# Patient Record
Sex: MALE | Race: WHITE | Employment: OTHER | URBAN - METROPOLITAN AREA
[De-identification: names, ages, dates, MRNs, and addresses within clinical notes are randomized per-mention and may not be internally consistent; named-entity substitution may affect disease eponyms.]

---

## 2017-09-27 ENCOUNTER — APPOINTMENT (OUTPATIENT)
Dept: CT IMAGING | Facility: HOSPITAL | Age: 57
End: 2017-09-27
Attending: EMERGENCY MEDICINE
Payer: MEDICARE

## 2017-09-27 ENCOUNTER — HOSPITAL ENCOUNTER (EMERGENCY)
Facility: HOSPITAL | Age: 57
Discharge: HOME | End: 2017-09-27
Attending: EMERGENCY MEDICINE
Payer: MEDICARE

## 2017-09-27 VITALS
WEIGHT: 184.94 LBS | RESPIRATION RATE: 14 BRPM | HEIGHT: 67 IN | BODY MASS INDEX: 29.03 KG/M2 | HEART RATE: 64 BPM | OXYGEN SATURATION: 95 % | DIASTOLIC BLOOD PRESSURE: 80 MMHG | TEMPERATURE: 98 F | SYSTOLIC BLOOD PRESSURE: 107 MMHG
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
## 2017-09-27 DIAGNOSIS — S09.90XA CLOSED HEAD INJURY, INITIAL ENCOUNTER: ICD-10-CM

## 2017-09-27 DIAGNOSIS — S00.81XA ABRASION OF FACE, INITIAL ENCOUNTER: ICD-10-CM

## 2017-09-27 PROCEDURE — 70450 CT HEAD/BRAIN W/O DYE: CPT | Performed by: EMERGENCY MEDICINE

## 2017-09-27 PROCEDURE — 99284 EMERGENCY DEPT VISIT MOD MDM: CPT

## 2017-09-27 NOTE — ED NOTES
Minor Crisostomo at [facility] notified that pt will be returning soon.

## 2017-09-27 NOTE — ED PROVIDER NOTES
Patient Seen in: [facility] Emergency Department    History   Patient presents with:  Head Neck Injury (neurologic, musculoskeletal)  Fall (musculoskeletal, neurologic)  Laceration Abrasion (integumentary)    Stated Complaint: FALL    HPI    Patient status: Former Smoker                                                              Packs/day: 0.00      Years: 0.00      Smokeless tobacco: Never Used                      Alcohol use:  No               Comment: former alcoholic       Review of Systems    P mental status, new concerns.       Disposition and Plan     Clinical Impression:  Fall, initial encounter  (primary encounter diagnosis)  Closed head injury, initial encounter  Abrasion of face, initial encounter    Disposition:  Discharge    Follow-up:  Bib Bradford

## 2017-09-27 NOTE — ED NOTES
Called [facility] because the eta passed and they were not in the bay, patient is growing inpatient.  New ETA is in 10 mins 1327

## 2019-02-17 ENCOUNTER — APPOINTMENT (OUTPATIENT)
Dept: CT IMAGING | Facility: HOSPITAL | Age: 59
End: 2019-02-17
Attending: EMERGENCY MEDICINE
Payer: MEDICARE

## 2019-02-17 ENCOUNTER — HOSPITAL ENCOUNTER (OUTPATIENT)
Facility: HOSPITAL | Age: 59
Setting detail: OBSERVATION
Discharge: NURSING, REHAB OR LONG-TERM CARE | End: 2019-02-19
Attending: EMERGENCY MEDICINE | Admitting: INTERNAL MEDICINE
Payer: MEDICARE

## 2019-02-17 ENCOUNTER — APPOINTMENT (OUTPATIENT)
Dept: GENERAL RADIOLOGY | Facility: HOSPITAL | Age: 59
End: 2019-02-17
Attending: EMERGENCY MEDICINE
Payer: MEDICARE

## 2019-02-17 DIAGNOSIS — J06.9 UPPER RESPIRATORY TRACT INFECTION, UNSPECIFIED TYPE: ICD-10-CM

## 2019-02-17 DIAGNOSIS — R09.02 HYPOXIA: Primary | ICD-10-CM

## 2019-02-17 PROBLEM — D69.6 THROMBOCYTOPENIA (HCC): Status: ACTIVE | Noted: 2019-02-17

## 2019-02-17 PROBLEM — R73.9 HYPERGLYCEMIA: Status: ACTIVE | Noted: 2019-02-17

## 2019-02-17 LAB
ALBUMIN SERPL-MCNC: 3.7 G/DL (ref 3.4–5)
ALBUMIN/GLOB SERPL: 1.1 {RATIO} (ref 1–2)
ALLENS TEST: POSITIVE
ALP LIVER SERPL-CCNC: 64 U/L (ref 45–117)
ALT SERPL-CCNC: 6 U/L (ref 16–61)
ANION GAP SERPL CALC-SCNC: 7 MMOL/L (ref 0–18)
APTT PPP: 30.6 SECONDS (ref 26.1–34.6)
ARTERIAL BLD GAS O2 SATURATION: 96 % (ref 92–100)
ARTERIAL BLOOD GAS BASE EXCESS: 1.9
ARTERIAL BLOOD GAS HCO3: 27.3 MEQ/L (ref 22–26)
ARTERIAL BLOOD GAS PCO2: 45 MM HG (ref 35–45)
ARTERIAL BLOOD GAS PH: 7.4 (ref 7.35–7.45)
ARTERIAL BLOOD GAS PO2: 97 MM HG (ref 80–105)
AST SERPL-CCNC: 13 U/L (ref 15–37)
BASOPHILS # BLD AUTO: 0.03 X10(3) UL (ref 0–0.2)
BASOPHILS NFR BLD AUTO: 0.6 %
BILIRUB SERPL-MCNC: 0.5 MG/DL (ref 0.1–2)
BILIRUB UR QL STRIP.AUTO: NEGATIVE
BUN BLD-MCNC: 13 MG/DL (ref 7–18)
BUN/CREAT SERPL: 17.1 (ref 10–20)
CALCIUM BLD-MCNC: 8.5 MG/DL (ref 8.5–10.1)
CALCULATED O2 SATURATION: 98 % (ref 92–100)
CARBOXYHEMOGLOBIN: 1.3 % SAT (ref 0–3)
CHLORIDE SERPL-SCNC: 104 MMOL/L (ref 98–107)
CLARITY UR REFRACT.AUTO: CLEAR
CO2 SERPL-SCNC: 30 MMOL/L (ref 21–32)
COLOR UR AUTO: YELLOW
CREAT BLD-MCNC: 0.76 MG/DL (ref 0.7–1.3)
DEPRECATED RDW RBC AUTO: 46.3 FL (ref 35.1–46.3)
EOSINOPHIL # BLD AUTO: 0 X10(3) UL (ref 0–0.7)
EOSINOPHIL NFR BLD AUTO: 0 %
ERYTHROCYTE [DISTWIDTH] IN BLOOD BY AUTOMATED COUNT: 13.4 % (ref 11–15)
GLOBULIN PLAS-MCNC: 3.4 G/DL (ref 2.8–4.4)
GLUCOSE BLD-MCNC: 101 MG/DL (ref 70–99)
GLUCOSE UR STRIP.AUTO-MCNC: NEGATIVE MG/DL
HCT VFR BLD AUTO: 44.9 % (ref 39–53)
HGB BLD-MCNC: 15.1 G/DL (ref 13–17.5)
IMM GRANULOCYTES # BLD AUTO: 0.03 X10(3) UL (ref 0–1)
IMM GRANULOCYTES NFR BLD: 0.6 %
INR BLD: 1.04 (ref 0.9–1.1)
L/M: 3 L/MIN
LACTATE SERPL-SCNC: 1.1 MMOL/L (ref 0.4–2)
LEUKOCYTE ESTERASE UR QL STRIP.AUTO: NEGATIVE
LYMPHOCYTES # BLD AUTO: 0.55 X10(3) UL (ref 1–4)
LYMPHOCYTES NFR BLD AUTO: 10.7 %
M PROTEIN MFR SERPL ELPH: 7.1 G/DL (ref 6.4–8.2)
MCH RBC QN AUTO: 31.5 PG (ref 26–34)
MCHC RBC AUTO-ENTMCNC: 33.6 G/DL (ref 31–37)
MCV RBC AUTO: 93.7 FL (ref 80–100)
METHEMOGLOBIN: 0.7 % SAT (ref 0.4–1.5)
MONOCYTES # BLD AUTO: 0.86 X10(3) UL (ref 0.1–1)
MONOCYTES NFR BLD AUTO: 16.7 %
NEUTROPHILS # BLD AUTO: 3.69 X10 (3) UL (ref 1.5–7.7)
NEUTROPHILS # BLD AUTO: 3.69 X10(3) UL (ref 1.5–7.7)
NEUTROPHILS NFR BLD AUTO: 71.4 %
NITRITE UR QL STRIP.AUTO: NEGATIVE
OSMOLALITY SERPL CALC.SUM OF ELEC: 292 MOSM/KG (ref 275–295)
P/F RATIO: 467.5 MMHG
PATIENT TEMPERATURE: 98.2 F
PH UR STRIP.AUTO: 5 [PH] (ref 4.5–8)
PLATELET # BLD AUTO: 141 10(3)UL (ref 150–450)
POTASSIUM SERPL-SCNC: 3.7 MMOL/L (ref 3.5–5.1)
PROT UR STRIP.AUTO-MCNC: NEGATIVE MG/DL
PSA SERPL DL<=0.01 NG/ML-MCNC: 14 SECONDS (ref 12.4–14.7)
RBC # BLD AUTO: 4.79 X10(6)UL (ref 4.3–5.7)
RBC UR QL AUTO: NEGATIVE
SODIUM SERPL-SCNC: 141 MMOL/L (ref 136–145)
SP GR UR STRIP.AUTO: 1.03 (ref 1–1.03)
TOTAL HEMOGLOBIN: 13.9 G/DL (ref 13.2–17.3)
TROPONIN I SERPL-MCNC: <0.045 NG/ML (ref ?–0.04)
UROBILINOGEN UR STRIP.AUTO-MCNC: <2 MG/DL
VALPROATE SERPL-MCNC: 50.6 UG/ML (ref 50–100)
WBC # BLD AUTO: 5.2 X10(3) UL (ref 4–11)

## 2019-02-17 PROCEDURE — 96361 HYDRATE IV INFUSION ADD-ON: CPT

## 2019-02-17 PROCEDURE — 85018 HEMOGLOBIN: CPT | Performed by: EMERGENCY MEDICINE

## 2019-02-17 PROCEDURE — 87633 RESP VIRUS 12-25 TARGETS: CPT | Performed by: EMERGENCY MEDICINE

## 2019-02-17 PROCEDURE — 36600 WITHDRAWAL OF ARTERIAL BLOOD: CPT | Performed by: EMERGENCY MEDICINE

## 2019-02-17 PROCEDURE — 99285 EMERGENCY DEPT VISIT HI MDM: CPT

## 2019-02-17 PROCEDURE — 81003 URINALYSIS AUTO W/O SCOPE: CPT | Performed by: EMERGENCY MEDICINE

## 2019-02-17 PROCEDURE — 96360 HYDRATION IV INFUSION INIT: CPT

## 2019-02-17 PROCEDURE — 85610 PROTHROMBIN TIME: CPT | Performed by: EMERGENCY MEDICINE

## 2019-02-17 PROCEDURE — 84145 PROCALCITONIN (PCT): CPT | Performed by: EMERGENCY MEDICINE

## 2019-02-17 PROCEDURE — 84484 ASSAY OF TROPONIN QUANT: CPT | Performed by: EMERGENCY MEDICINE

## 2019-02-17 PROCEDURE — 87798 DETECT AGENT NOS DNA AMP: CPT | Performed by: EMERGENCY MEDICINE

## 2019-02-17 PROCEDURE — 71045 X-RAY EXAM CHEST 1 VIEW: CPT | Performed by: EMERGENCY MEDICINE

## 2019-02-17 PROCEDURE — 83050 HGB METHEMOGLOBIN QUAN: CPT | Performed by: EMERGENCY MEDICINE

## 2019-02-17 PROCEDURE — 87486 CHLMYD PNEUM DNA AMP PROBE: CPT | Performed by: EMERGENCY MEDICINE

## 2019-02-17 PROCEDURE — 87999 UNLISTED MICROBIOLOGY PX: CPT

## 2019-02-17 PROCEDURE — 87581 M.PNEUMON DNA AMP PROBE: CPT | Performed by: EMERGENCY MEDICINE

## 2019-02-17 PROCEDURE — 82375 ASSAY CARBOXYHB QUANT: CPT | Performed by: EMERGENCY MEDICINE

## 2019-02-17 PROCEDURE — 83605 ASSAY OF LACTIC ACID: CPT | Performed by: EMERGENCY MEDICINE

## 2019-02-17 PROCEDURE — 93010 ELECTROCARDIOGRAM REPORT: CPT

## 2019-02-17 PROCEDURE — 80164 ASSAY DIPROPYLACETIC ACD TOT: CPT | Performed by: EMERGENCY MEDICINE

## 2019-02-17 PROCEDURE — 70450 CT HEAD/BRAIN W/O DYE: CPT | Performed by: EMERGENCY MEDICINE

## 2019-02-17 PROCEDURE — 80053 COMPREHEN METABOLIC PANEL: CPT | Performed by: EMERGENCY MEDICINE

## 2019-02-17 PROCEDURE — 93005 ELECTROCARDIOGRAM TRACING: CPT

## 2019-02-17 PROCEDURE — 85025 COMPLETE CBC W/AUTO DIFF WBC: CPT | Performed by: EMERGENCY MEDICINE

## 2019-02-17 PROCEDURE — 82803 BLOOD GASES ANY COMBINATION: CPT | Performed by: EMERGENCY MEDICINE

## 2019-02-17 PROCEDURE — 85730 THROMBOPLASTIN TIME PARTIAL: CPT | Performed by: EMERGENCY MEDICINE

## 2019-02-17 RX ORDER — SODIUM CHLORIDE 9 MG/ML
INJECTION, SOLUTION INTRAVENOUS ONCE
Status: COMPLETED | OUTPATIENT
Start: 2019-02-17 | End: 2019-02-17

## 2019-02-17 RX ORDER — IBUPROFEN 600 MG/1
600 TABLET ORAL ONCE
Status: COMPLETED | OUTPATIENT
Start: 2019-02-17 | End: 2019-02-17

## 2019-02-18 ENCOUNTER — APPOINTMENT (OUTPATIENT)
Dept: GENERAL RADIOLOGY | Facility: HOSPITAL | Age: 59
End: 2019-02-18
Attending: INTERNAL MEDICINE
Payer: MEDICARE

## 2019-02-18 ENCOUNTER — APPOINTMENT (OUTPATIENT)
Dept: CT IMAGING | Facility: HOSPITAL | Age: 59
End: 2019-02-18
Attending: INTERNAL MEDICINE
Payer: MEDICARE

## 2019-02-18 PROBLEM — J06.9 UPPER RESPIRATORY TRACT INFECTION, UNSPECIFIED TYPE: Status: ACTIVE | Noted: 2019-02-18

## 2019-02-18 LAB
ADENOVIRUS PCR:: NEGATIVE
ALBUMIN SERPL-MCNC: 3.4 G/DL (ref 3.4–5)
ALBUMIN/GLOB SERPL: 1.2 {RATIO} (ref 1–2)
ALP LIVER SERPL-CCNC: 57 U/L (ref 45–117)
ALT SERPL-CCNC: 18 U/L (ref 16–61)
ANION GAP SERPL CALC-SCNC: 6 MMOL/L (ref 0–18)
AST SERPL-CCNC: 18 U/L (ref 15–37)
ATRIAL RATE: 92 BPM
B PERT DNA SPEC QL NAA+PROBE: NEGATIVE
BASOPHILS # BLD AUTO: 0.01 X10(3) UL (ref 0–0.2)
BASOPHILS NFR BLD AUTO: 0.2 %
BILIRUB SERPL-MCNC: 0.4 MG/DL (ref 0.1–2)
BUN BLD-MCNC: 12 MG/DL (ref 7–18)
BUN/CREAT SERPL: 20 (ref 10–20)
C PNEUM DNA SPEC QL NAA+PROBE: NEGATIVE
CALCIUM BLD-MCNC: 8.1 MG/DL (ref 8.5–10.1)
CHLORIDE SERPL-SCNC: 107 MMOL/L (ref 98–107)
CO2 SERPL-SCNC: 29 MMOL/L (ref 21–32)
CORONAVIRUS 229E PCR:: NEGATIVE
CORONAVIRUS HKU1 PCR:: NEGATIVE
CORONAVIRUS NL63 PCR:: NEGATIVE
CORONAVIRUS OC43 PCR:: NEGATIVE
CREAT BLD-MCNC: 0.6 MG/DL (ref 0.7–1.3)
D-DIMER: 0.56 UG/ML FEU (ref 0–0.49)
DEPRECATED RDW RBC AUTO: 47.1 FL (ref 35.1–46.3)
EOSINOPHIL # BLD AUTO: 0.01 X10(3) UL (ref 0–0.7)
EOSINOPHIL NFR BLD AUTO: 0.2 %
ERYTHROCYTE [DISTWIDTH] IN BLOOD BY AUTOMATED COUNT: 13.3 % (ref 11–15)
FLUAV H1 2009 PAND RNA SPEC QL NAA+PROBE: POSITIVE
FLUBV RNA SPEC QL NAA+PROBE: NEGATIVE
GLOBULIN PLAS-MCNC: 2.9 G/DL (ref 2.8–4.4)
GLUCOSE BLD-MCNC: 96 MG/DL (ref 70–99)
HCT VFR BLD AUTO: 42.2 % (ref 39–53)
HGB BLD-MCNC: 13.9 G/DL (ref 13–17.5)
IMM GRANULOCYTES # BLD AUTO: 0.03 X10(3) UL (ref 0–1)
IMM GRANULOCYTES NFR BLD: 0.5 %
LYMPHOCYTES # BLD AUTO: 0.48 X10(3) UL (ref 1–4)
LYMPHOCYTES NFR BLD AUTO: 7.6 %
M PROTEIN MFR SERPL ELPH: 6.3 G/DL (ref 6.4–8.2)
MCH RBC QN AUTO: 31.4 PG (ref 26–34)
MCHC RBC AUTO-ENTMCNC: 32.9 G/DL (ref 31–37)
MCV RBC AUTO: 95.5 FL (ref 80–100)
METAPNEUMOVIRUS PCR:: NEGATIVE
MONOCYTES # BLD AUTO: 0.84 X10(3) UL (ref 0.1–1)
MONOCYTES NFR BLD AUTO: 13.3 %
MYCOPLASMA PNEUMONIA PCR:: NEGATIVE
NEUTROPHILS # BLD AUTO: 4.94 X10 (3) UL (ref 1.5–7.7)
NEUTROPHILS # BLD AUTO: 4.94 X10(3) UL (ref 1.5–7.7)
NEUTROPHILS NFR BLD AUTO: 78.2 %
OSMOLALITY SERPL CALC.SUM OF ELEC: 294 MOSM/KG (ref 275–295)
P AXIS: 27 DEGREES
P-R INTERVAL: 162 MS
PARAINFLUENZA 1 PCR:: NEGATIVE
PARAINFLUENZA 2 PCR:: NEGATIVE
PARAINFLUENZA 3 PCR:: NEGATIVE
PARAINFLUENZA 4 PCR:: NEGATIVE
PLATELET # BLD AUTO: 122 10(3)UL (ref 150–450)
POTASSIUM SERPL-SCNC: 3.6 MMOL/L (ref 3.5–5.1)
PROCALCITONIN SERPL-MCNC: <0.11 NG/ML
Q-T INTERVAL: 350 MS
QRS DURATION: 88 MS
QTC CALCULATION (BEZET): 432 MS
R AXIS: 32 DEGREES
RBC # BLD AUTO: 4.42 X10(6)UL (ref 4.3–5.7)
RHINOVIRUS/ENTERO PCR:: NEGATIVE
RSV RNA SPEC QL NAA+PROBE: NEGATIVE
SODIUM SERPL-SCNC: 142 MMOL/L (ref 136–145)
T AXIS: 19 DEGREES
VENTRICULAR RATE: 92 BPM
WBC # BLD AUTO: 6.3 X10(3) UL (ref 4–11)

## 2019-02-18 PROCEDURE — 85025 COMPLETE CBC W/AUTO DIFF WBC: CPT | Performed by: INTERNAL MEDICINE

## 2019-02-18 PROCEDURE — 97165 OT EVAL LOW COMPLEX 30 MIN: CPT

## 2019-02-18 PROCEDURE — 92610 EVALUATE SWALLOWING FUNCTION: CPT

## 2019-02-18 PROCEDURE — 85378 FIBRIN DEGRADE SEMIQUANT: CPT | Performed by: INTERNAL MEDICINE

## 2019-02-18 PROCEDURE — 96372 THER/PROPH/DIAG INJ SC/IM: CPT

## 2019-02-18 PROCEDURE — 71045 X-RAY EXAM CHEST 1 VIEW: CPT | Performed by: INTERNAL MEDICINE

## 2019-02-18 PROCEDURE — 80053 COMPREHEN METABOLIC PANEL: CPT | Performed by: INTERNAL MEDICINE

## 2019-02-18 PROCEDURE — 97161 PT EVAL LOW COMPLEX 20 MIN: CPT

## 2019-02-18 PROCEDURE — 97530 THERAPEUTIC ACTIVITIES: CPT

## 2019-02-18 PROCEDURE — 71275 CT ANGIOGRAPHY CHEST: CPT | Performed by: INTERNAL MEDICINE

## 2019-02-18 RX ORDER — ONDANSETRON 2 MG/ML
4 INJECTION INTRAMUSCULAR; INTRAVENOUS EVERY 6 HOURS PRN
Status: DISCONTINUED | OUTPATIENT
Start: 2019-02-18 | End: 2019-02-19

## 2019-02-18 RX ORDER — SODIUM CHLORIDE 9 MG/ML
INJECTION, SOLUTION INTRAVENOUS CONTINUOUS
Status: ACTIVE | OUTPATIENT
Start: 2019-02-18 | End: 2019-02-18

## 2019-02-18 RX ORDER — OSELTAMIVIR PHOSPHATE 75 MG/1
75 CAPSULE ORAL 2 TIMES DAILY
Status: DISCONTINUED | OUTPATIENT
Start: 2019-02-18 | End: 2019-02-19

## 2019-02-18 RX ORDER — SODIUM CHLORIDE 450 MG/100ML
INJECTION, SOLUTION INTRAVENOUS CONTINUOUS
Status: DISCONTINUED | OUTPATIENT
Start: 2019-02-18 | End: 2019-02-19

## 2019-02-18 RX ORDER — HEPARIN SODIUM 5000 [USP'U]/ML
5000 INJECTION, SOLUTION INTRAVENOUS; SUBCUTANEOUS EVERY 12 HOURS
Status: DISCONTINUED | OUTPATIENT
Start: 2019-02-18 | End: 2019-02-19

## 2019-02-18 RX ORDER — ACETAMINOPHEN 325 MG/1
650 TABLET ORAL EVERY 4 HOURS PRN
Status: DISCONTINUED | OUTPATIENT
Start: 2019-02-18 | End: 2019-02-19

## 2019-02-18 RX ORDER — HYDRALAZINE HYDROCHLORIDE 20 MG/ML
10 INJECTION INTRAMUSCULAR; INTRAVENOUS EVERY 6 HOURS PRN
Status: DISCONTINUED | OUTPATIENT
Start: 2019-02-18 | End: 2019-02-19

## 2019-02-18 RX ORDER — OSELTAMIVIR PHOSPHATE 75 MG/1
75 CAPSULE ORAL 2 TIMES DAILY
Qty: 9 CAPSULE | Refills: 0 | Status: SHIPPED | OUTPATIENT
Start: 2019-02-18

## 2019-02-18 RX ORDER — IPRATROPIUM BROMIDE AND ALBUTEROL SULFATE 2.5; .5 MG/3ML; MG/3ML
3 SOLUTION RESPIRATORY (INHALATION) EVERY 4 HOURS PRN
Status: DISCONTINUED | OUTPATIENT
Start: 2019-02-18 | End: 2019-02-19

## 2019-02-18 RX ORDER — DIVALPROEX SODIUM 500 MG/1
500 TABLET, DELAYED RELEASE ORAL 3 TIMES DAILY
Status: DISCONTINUED | OUTPATIENT
Start: 2019-02-18 | End: 2019-02-19

## 2019-02-18 RX ORDER — POLYETHYLENE GLYCOL 3350 17 G/17G
17 POWDER, FOR SOLUTION ORAL DAILY
Status: DISCONTINUED | OUTPATIENT
Start: 2019-02-18 | End: 2019-02-19

## 2019-02-18 RX ORDER — MELATONIN
1000 NIGHTLY
Status: DISCONTINUED | OUTPATIENT
Start: 2019-02-18 | End: 2019-02-19

## 2019-02-18 RX ORDER — LEVOTHYROXINE SODIUM 0.1 MG/1
100 TABLET ORAL
Status: DISCONTINUED | OUTPATIENT
Start: 2019-02-18 | End: 2019-02-19

## 2019-02-18 RX ORDER — ASPIRIN 325 MG
325 TABLET ORAL DAILY
Status: DISCONTINUED | OUTPATIENT
Start: 2019-02-18 | End: 2019-02-19

## 2019-02-18 RX ORDER — CALCIUM CARBONATE 200(500)MG
500 TABLET,CHEWABLE ORAL DAILY
Status: DISCONTINUED | OUTPATIENT
Start: 2019-02-18 | End: 2019-02-19

## 2019-02-18 RX ORDER — OXYBUTYNIN CHLORIDE 5 MG/1
5 TABLET, EXTENDED RELEASE ORAL DAILY
Status: DISCONTINUED | OUTPATIENT
Start: 2019-02-18 | End: 2019-02-19

## 2019-02-18 RX ORDER — DIPHENHYDRAMINE HYDROCHLORIDE 50 MG/ML
25 INJECTION INTRAMUSCULAR; INTRAVENOUS EVERY 6 HOURS PRN
Status: DISCONTINUED | OUTPATIENT
Start: 2019-02-18 | End: 2019-02-19

## 2019-02-18 RX ORDER — SODIUM CHLORIDE 9 MG/ML
500 INJECTION, SOLUTION INTRAVENOUS CONTINUOUS
Status: ACTIVE | OUTPATIENT
Start: 2019-02-18 | End: 2019-02-18

## 2019-02-18 NOTE — CM/SW NOTE
RN informed  that pt may be ready for dc back to [facility]. [facility] on wll call. [facility] notified of possible dc.   Heather Seaman, 02/18/19, 2:35 PM

## 2019-02-18 NOTE — PHYSICAL THERAPY NOTE
PHYSICAL THERAPY EVALUATION - INPATIENT     Room Number: 7815/0256-H  Evaluation Date: 2/18/2019  Type of Evaluation: Initial  Physician Order: PT Eval and Treat    Presenting Problem: fever & cough, + fall  Reason for Therapy: Mobility Dysfunction a risk    WEIGHT BEARING RESTRICTION  Weight Bearing Restriction: None                PAIN ASSESSMENT  Ratin  Location: denies       COGNITION  · Overall Cognitive Status:  HANS - unable to assess  · Following Commands:  follows one-step commands inconsis Degree of Impairment: 50.57%   Standardized Score (AM-PAC Scale): 42.13   CMS Modifier (G-Code): CK    FUNCTIONAL ABILITY STATUS  Gait Assessment   Gait Assistance: Dependent assistance(FIM due to distance actual min a)  Distance (ft): 25  Assistive Device to prior to level of function. DISCHARGE RECOMMENDATIONS  PT Discharge Recommendations: Home with home health PT(return to skilled nursing facility)    PLAN  PT Treatment Plan: Bed mobility; Endurance; Energy conservation;Patient education; Family education;

## 2019-02-18 NOTE — ED NOTES
Family arrived and told RN that the nursing home told her he fell. EMS did not get that same information to relay to the nurse.

## 2019-02-18 NOTE — CM/SW NOTE
02/18/19 1000   CM/SW Referral Data   Referral Source Social Work (self-referral)   Reason for Referral Discharge [facility] Staff   Patient [address] Name Hank Lamas)   Discharge

## 2019-02-18 NOTE — OCCUPATIONAL THERAPY NOTE
OCCUPATIONAL THERAPY EVALUATION - INPATIENT     Room Number: 1626/0225-L  Evaluation Date: 2/18/2019  Type of Evaluation: Initial  Presenting Problem: hypoxia    Physician Order: IP Consult to Occupational Therapy  Reason for Therapy: ADL/IADL Dysfunction Right  Drives: No       Prior Level of Function: Unclear PLOF d/t pt not being able to provide this information. Per chart pt lives in a skilled facility. Pt stated he walks with a walker and does get help with some ADLs.      SUBJECTIVE   Pt stated, \"I' assistance    Skilled Therapy Provided: Pt received supine for eval.  Explained the role of OT while in the hospital.   Pt participated in UE assessment and orientation. Pt sat EOB.   Pt stood to utilize the urinal.  Pt completed mobility in the room and h facility)       PLAN  OT Treatment Plan: Balance activities; Energy conservation/work simplification techniques;ADL training;Functional transfer training; Endurance training;Patient/Family education;Patient/Family training;Continued evaluation  [facility]

## 2019-02-18 NOTE — SLP NOTE
ADULT SWALLOWING EVALUATION    ASSESSMENT    ASSESSMENT/OVERALL IMPRESSION:  Order received for bedside swallow evaluation to r/o aspiration; chart review revealed Pt previously on mechanical soft ground diet PTA.  Pt is a 59 y/o male who presented to ED af Problem:    Hypoxia  Active Problems:     [facility]    Hyperglycemia    Upper respiratory tract infection, unspecified type      Past Medical History  Past Medical History:   Diagnosis Date   • Alcoholic ([facility]    • Alzheimer disease    • Anxiet Limits  Range of Motion: Within Functional Limits  Rate of Motion: Within Functional Limits    Voice Quality: Clear  Respiratory Status: Unlabored  Consistencies Trialed: Thin liquids; Soft solid;Hard solid  Method of Presentation: Self presentation;Cup;Str

## 2019-02-18 NOTE — CONSULTS
[facility]  Report of Consultation    Keiry Cannond Patient Status:  Observation    6/10/1960 MRN [id number]   [facility] 3NE-A Attending Zuleyma Sams MD   Hosp Day # 0 PCP Fara Sigala MD     Reason for Consultation: Fever, [facility]     History reviewed. No pertinent family history. reports that he has quit smoking. he has never used smokeless tobacco. He reports that he does not drink alcohol or use drugs.     Medications:    Medications Prior to Admission:  bisacodyl (D needed. Disp:  Rfl:  Taking   Ergocalciferol (VITAMIN D CAPS 20053 IU OR) Take  by mouth.  Disp:  Rfl:  Taking     Review of Systems: No new/positive culture results    Vital signs:  /69 (BP Location: Right arm)   Pulse 95   Temp 98.7 °F (37.1 °C) (Ax Recent Labs   Lab  02/17/19   7457   ABGPHT  7.40   TJTXLC4C  45   XIVJS5Y  97   ABGHCO3  27.3*   ABGBE  1.9   TEMP  98.2   MARQUISE  Positive   SITE  Right Radial   DEV  Nasal cannula   THGB  13.9         Cultures: Nasal swab positive for influenza A

## 2019-02-18 NOTE — ED INITIAL ASSESSMENT (HPI)
Fever and cough. Nursing home staff stated his blood pressure and heart rate dropped today and low o2 sats.

## 2019-02-18 NOTE — ED PROVIDER NOTES
Patient Seen in: [facility] Emergency Department    History   Patient presents with:  Fever (infectious)  Cough/URI    Stated Complaint: fever cough    HPI  Patient is a 58-year-old male who is a poor historian secondary to traumatic brain injury. HPI.  Constitutional and vital signs reviewed. All other systems reviewed and negative except as noted above.     Physical Exam     ED Triage Vitals [02/17/19 1930]   /69   Pulse 91   Resp 25   Temp (!) 101.4 °F (38.6 °C)   Temp src Temporal   Sp the following components:    D-Dimer 0.56 (*)     All other components within normal limits    Narrative:     FEU = Fibrinogen Equivalent Units.     D-Dimer results of less than 0.5 ug/mL (FEU) have been shown to contribute to the exclusion of venous thromb Please view results for these tests on the individual orders. RAINBOW DRAW BLUE   RAINBOW DRAW LAVENDER   RAINBOW DRAW LIGHT GREEN   RAINBOW DRAW GOLD     EKG    Rate, intervals and axes as noted on EKG Report.   Rate: 92  Rhythm: Sinus Rh unspecified type J06.9 2/18/2019

## 2019-02-18 NOTE — PLAN OF CARE
NURSING ADMISSION NOTE      Patient admitted via Cart  Oriented to room. Safety precautions initiated. Bed in low position. Call light in reach. Admission navigator completed per nursing paperwork. A&O x 1-2. On 3L O2. NSR on tele.  Seizure precau

## 2019-02-18 NOTE — H&P
[facility]    PATIENT'S NAME: Chanelle Ridenilson   ATTENDING PHYSICIAN: Magda Kuhn M.D.    PATIENT ACCOUNT#:   [de-identified]    LOCATION:  [address] [place] [address]  MEDICAL RECORD #:   [id number]       YOB: 1960  ADMISSION DATE:       02/17/201 positive for influenza A H1N1. Urinalysis negative. CMP essentially unremarkable. CT brain, nothing acute. IMPRESSION AND PLAN:    1. Influenza A. Tamiflu started.   2.   Hypoxia, most likely related to upper respiratory infection secondary t

## 2019-02-18 NOTE — PLAN OF CARE
Impaired Swallowing    • Minimize aspiration risk Progressing        Patient is alert and orientated x2. Denies pain or discomfort. VSS, SR-ST on tele, 3L O2. Droplet precaution for Flu +, pt started on tamilflu. D-dimer elevated, Dr. Lori Camarena notified.

## 2019-02-19 VITALS
DIASTOLIC BLOOD PRESSURE: 75 MMHG | HEIGHT: 66 IN | HEART RATE: 72 BPM | RESPIRATION RATE: 20 BRPM | OXYGEN SATURATION: 96 % | BODY MASS INDEX: 29.67 KG/M2 | SYSTOLIC BLOOD PRESSURE: 123 MMHG | WEIGHT: 184.63 LBS | TEMPERATURE: 98 F

## 2019-02-19 LAB
BASOPHILS # BLD AUTO: 0.01 X10(3) UL (ref 0–0.2)
BASOPHILS NFR BLD AUTO: 0.2 %
DEPRECATED RDW RBC AUTO: 45.4 FL (ref 35.1–46.3)
EOSINOPHIL # BLD AUTO: 0.01 X10(3) UL (ref 0–0.7)
EOSINOPHIL NFR BLD AUTO: 0.2 %
ERYTHROCYTE [DISTWIDTH] IN BLOOD BY AUTOMATED COUNT: 13.2 % (ref 11–15)
HCT VFR BLD AUTO: 39.8 % (ref 39–53)
HGB BLD-MCNC: 12.8 G/DL (ref 13–17.5)
IMM GRANULOCYTES # BLD AUTO: 0.03 X10(3) UL (ref 0–1)
IMM GRANULOCYTES NFR BLD: 0.5 %
LYMPHOCYTES # BLD AUTO: 1.17 X10(3) UL (ref 1–4)
LYMPHOCYTES NFR BLD AUTO: 18.7 %
MCH RBC QN AUTO: 30.3 PG (ref 26–34)
MCHC RBC AUTO-ENTMCNC: 32.2 G/DL (ref 31–37)
MCV RBC AUTO: 94.1 FL (ref 80–100)
MONOCYTES # BLD AUTO: 0.91 X10(3) UL (ref 0.1–1)
MONOCYTES NFR BLD AUTO: 14.5 %
NEUTROPHILS # BLD AUTO: 4.14 X10 (3) UL (ref 1.5–7.7)
NEUTROPHILS # BLD AUTO: 4.14 X10(3) UL (ref 1.5–7.7)
NEUTROPHILS NFR BLD AUTO: 65.9 %
PLATELET # BLD AUTO: 119 10(3)UL (ref 150–450)
RBC # BLD AUTO: 4.23 X10(6)UL (ref 4.3–5.7)
WBC # BLD AUTO: 6.3 X10(3) UL (ref 4–11)

## 2019-02-19 PROCEDURE — 96372 THER/PROPH/DIAG INJ SC/IM: CPT

## 2019-02-19 PROCEDURE — 85025 COMPLETE CBC W/AUTO DIFF WBC: CPT | Performed by: INTERNAL MEDICINE

## 2019-02-19 NOTE — PLAN OF CARE
A/O x 4. Parkinson's, dementia. Follows instructions. Confused at times. Droplet precautions for flu+. Tamiflu started. Pills whole with water  2L PRN. . Room air daytime baseline.  Cough dry, not productive  CTA chest negative for PE  Heparin subQ  Ord

## 2019-02-19 NOTE — PROGRESS NOTES
[facility]  Progress Note    Dawn Galvin Patient Status:  Observation    6/10/1960 MRN [id number]   [facility] 3NE-A Attending Zamzam Thompson MD   Hosp Day # 0 PCP Bianca Wagner MD        Assessment and Plan:     1.  Influenza 185 lb (83.9 kg)      Physical Exam:  General: no apparent distress  Heart: Regular rate and rhythm, normal S1S2  Lungs: no wheezign   Abdomen: soft, nontender, no guarding, no rebound, positive BS  Extremity: no lower extremity edema, no cyanosis  Neurolo (BENADRYL) injection 25 mg 25 mg Intravenous Q6H PRN   Heparin Sodium (Porcine) 5000 UNIT/ML injection 5,000 Units 5,000 Units Subcutaneous Q12H   ondansetron HCl (ZOFRAN) injection 4 mg 4 mg Intravenous Q6H PRN   0.45% NaCl infusion  Intravenous Continuou

## 2019-02-19 NOTE — CM/SW NOTE
SW updated that pt may be stable for return to [place] today. MICHELLE spoke with admissions liaison, Volanda Paget who is aware of and confirms bed available for return. [facility] placed on will call for transport ([phone]). PCS completed via [facility].  Social wor

## 2019-02-19 NOTE — PLAN OF CARE
NURSING DISCHARGE NOTE    Discharged Nursing home via Ambulance. Accompanied by Support staff  Belongings Taken by patient/family. Called [facility] with updates. Hard copy RX sent in envelope along with discharge paperwork for NH.  Educated patient

## 2019-02-19 NOTE — PLAN OF CARE
Assumed care of patient at 0700. Patient is AOx2-3 this morning. Weening off of oxygen this morning, as he does not regularly wear. NSR on telemetry. SCD's in place & heparin for DVT prophylaxis. Bed alarm on for fall prevention.   [place] catheter in pl

## 2019-02-19 NOTE — DISCHARGE SUMMARY
[facility]  Discharge Summary    Compa Castro Patient Status:  Observation    6/10/1960 MRN [id number]   [facility] 3NE-A Attending Yenifer West MD   Hosp Day # 0 PCP Jesus Bonner MD     Date of Admission: 2019    Date of Atraumatic. Eyes:  EOMI. NECK:  Supple. No JVD. LUNGS:  Clear to auscultation. No rhonchi or wheeze. HEART:  Regular rate and rhythm. No S3.  ABDOMEN:  Bowel sounds present. Soft, nontender. EXTREMITIES:  No cyanosis or icterus.    NEUROLOGIC:  Ge by mouth 2 (two) times daily. Qty: 9 capsule Refills: 0      CONTINUE these medications which have NOT CHANGED    bisacodyl (DULCOLAX) 5 MG Oral Tab EC  Take 5 mg by mouth 2 (two) times daily. Levothyroxine Sodium 100 MCG Oral Cap  Take  by mouth.     d

## 2019-02-20 NOTE — CM/SW NOTE
02/20/19 0911   Discharge disposition   Expected discharge disposition Skilled Nurs   Name of Facillity/Home Care/Hospice ([address])   Discharge transportation [employer]

## 2019-03-04 ENCOUNTER — APPOINTMENT (OUTPATIENT)
Dept: CT IMAGING | Facility: HOSPITAL | Age: 59
End: 2019-03-04
Attending: EMERGENCY MEDICINE
Payer: MEDICARE

## 2019-03-04 ENCOUNTER — HOSPITAL ENCOUNTER (EMERGENCY)
Facility: HOSPITAL | Age: 59
Discharge: HOME | End: 2019-03-04
Attending: EMERGENCY MEDICINE
Payer: MEDICARE

## 2019-03-04 VITALS
RESPIRATION RATE: 16 BRPM | BODY MASS INDEX: 29.65 KG/M2 | DIASTOLIC BLOOD PRESSURE: 68 MMHG | TEMPERATURE: 98 F | SYSTOLIC BLOOD PRESSURE: 102 MMHG | OXYGEN SATURATION: 95 % | WEIGHT: 184.5 LBS | HEIGHT: 66 IN | HEART RATE: 65 BPM

## 2019-03-04 DIAGNOSIS — S41.112A LACERATION OF LEFT UPPER EXTREMITY, INITIAL ENCOUNTER: ICD-10-CM

## 2019-03-04 DIAGNOSIS — S01.312A LACERATION OF AURICLE OF LEFT EAR, INITIAL ENCOUNTER: Primary | ICD-10-CM

## 2019-03-04 DIAGNOSIS — S09.90XA INJURY OF HEAD, INITIAL ENCOUNTER: ICD-10-CM

## 2019-03-04 PROCEDURE — 99284 EMERGENCY DEPT VISIT MOD MDM: CPT

## 2019-03-04 PROCEDURE — 90471 IMMUNIZATION ADMIN: CPT

## 2019-03-04 PROCEDURE — 70450 CT HEAD/BRAIN W/O DYE: CPT | Performed by: EMERGENCY MEDICINE

## 2019-03-04 NOTE — ED PROVIDER NOTES
Patient Seen in: [facility] Emergency Department    History   Patient presents with:  Fall (musculoskeletal, neurologic)    Stated Complaint: Pt fell while transfering himself from bed to stretcher    HPI    71-year-old male presents to the emergenc 144/74   Pulse 80   Resp 19   Temp 98 °F (36.7 °C)   Temp src Tympanic   SpO2 98 %   O2 Device None (Room air)       Current:/68   Pulse 65   Temp 98 °F (36.7 °C) (Tympanic)   Resp 16   Ht 167.6 cm (5' 6\")   Wt 83.7 kg   SpO2 95%   BMI 29.78 kg/m² Fall with head injury and superficial ear and arm lacerations.             Disposition and Plan     Clinical Impression:  Laceration of auricle of left ear, initial encounter  (primary encounter diagnosis)  Laceration of left upper extremity, initial enco

## 2019-03-04 NOTE — ED INITIAL ASSESSMENT (HPI)
Pt here after falling to the floor while trying to get into wheel chair. Pt is at base line and notes a laceration to inside of ear. Pt has hx of TBI and notes he is at his baseline.

## 2022-10-07 ENCOUNTER — LAB REQUISITION (OUTPATIENT)
Dept: LAB | Age: 62
End: 2022-10-07

## 2022-10-07 DIAGNOSIS — Z13.9 ENCOUNTER FOR SCREENING, UNSPECIFIED: ICD-10-CM

## 2022-10-07 PROCEDURE — PSEU8226 VALPROIC ACID: Performed by: CLINICAL MEDICAL LABORATORY

## 2022-10-07 PROCEDURE — 80164 ASSAY DIPROPYLACETIC ACD TOT: CPT | Performed by: CLINICAL MEDICAL LABORATORY

## 2022-10-08 LAB — VALPROATE SERPL-MCNC: 55 MCG/ML (ref 50–125)

## (undated) NOTE — ED AVS SNAPSHOT
Kenney Flores   MRN: [id number]    Department:  [facility] Emergency Department   Date of Visit:  3/4/2019           Disclosure     Insurance plans vary and the physician(s) referred by the ER may not be covered by your plan.  Please contact you tell this physician (or your personal doctor if your instructions are to return to your personal doctor) about any new or lasting problems. The primary care or specialist physician will see patients referred from the [facility] Emergency Department.  Salvadore Skiff

## (undated) NOTE — IP AVS SNAPSHOT
[address]            (For Outpatient Use Only) Initial Admit Date: 2/17/2019   Inpt/Obs Admit Date: Inpt: N/A / Obs: 02/18/19   Discharge Date:    [place]:  [de-identified]   MRN: [de-identified]   CSN: [id number]        [facility] Subscriber Name: Jermain Verma : 06/10/1960   Subscriber ID: [id number] Pt Rel to Subscriber: [facility] Account Financial Class: Medicare    2019